# Patient Record
Sex: FEMALE | Race: WHITE
[De-identification: names, ages, dates, MRNs, and addresses within clinical notes are randomized per-mention and may not be internally consistent; named-entity substitution may affect disease eponyms.]

---

## 2021-05-20 NOTE — EDM.PDOC
ED HPI GENERAL MEDICAL PROBLEM





- General


Chief Complaint: OB/GYN Problem


Stated Complaint: VAGINAL BLEEDING/CHEST PAIN


Time Seen by Provider: 05/20/21 18:49


Source of Information: Reports: Patient


History Limitations: Reports: No Limitations





- History of Present Illness


INITIAL COMMENTS - FREE TEXT/NARRATIVE: 





The patient presents with a few complaints.  She said today she had some chest 

pain.  The pain was in the right chest and it radiated to her right upper back. 

She has no pain now.  She also has been having some abdominal pain at times.  

The pain is in the mid to upper abdomen.  She has no fever, chills, cough, 

congestion, runny nose, shortness of breath, nausea, vomiting, diarrhea or 

dysuria.  She says for about 2 1/2 weeks she has been having vaginal bleeding.  

She has no pain with it.  She was on the Depo shot months ago but quit.  She did

see her primary care and she referred her to OB/GYN but they are all full.  She 

has a history of headaches from a skull fracture when she was young.  She is on 

propranolol for that.  She also asked to be checked for STDs.  She said she just

kicked her boyfriend out because he was cheating on her.  I asked if she wanted 

to be tested for HIV and she said now.  She just wanted the gonorrhea and 

chlamydia.  She had chlamydia in the past.  She denies having symptoms such as 

vaginal discharge or discomfort.


Onset: Sudden


Duration: Hour(s):


Location: Reports: Chest


Quality: Reports: Sharp


Severity: Moderate


Improves with: Reports: None


Worsens with: Reports: None


Associated Symptoms: Reports: Chest Pain.  Denies: Cough, Fever/Chills, 

Headaches, Nausea/Vomiting, Shortness of Breath


  ** Middle Chest


Pain Score (Numeric/FACES): 4





- Related Data


                                    Allergies











Allergy/AdvReac Type Severity Reaction Status Date / Time


 


No Known Allergies Allergy   Verified 05/20/21 18:48











Home Meds: 


                                    Home Meds





Propranolol HCl [Propranolol] 40 mg PO BID 05/20/21 [History]











Past Medical History


Neurological History: Reports: Migraines





- Infectious Disease History


Infectious Disease History: Reports: None





Social & Family History





- Family History


Family Medical History: No Pertinent Family History





- Tobacco Use


Tobacco Use Status *Q: Never Tobacco User


Second Hand Smoke Exposure: No





- Caffeine Use


Caffeine Use: Reports: None





- Recreational Drug Use


Recreational Drug Use: No





ED ROS GENERAL





- Review of Systems


Review Of Systems: See Below


Constitutional: Reports: No Symptoms


HEENT: Reports: No Symptoms


Respiratory: Reports: No Symptoms


Cardiovascular: Reports: Chest Pain


Endocrine: Reports: No Symptoms


GI/Abdominal: Reports: Abdominal Pain.  Denies: Diarrhea, Nausea, Vomiting


: Reports: Other (vaginal bleeding).  Denies: Dysuria


Musculoskeletal: Reports: No Symptoms


Skin: Reports: No Symptoms





ED EXAM, GI/ABD





- Physical Exam


Exam: See Below


Exam Limited By: No Limitations


General Appearance: Alert, No Apparent Distress


Ears: Normal External Exam


Nose: Normal Inspection


Head: Atraumatic, Normocephalic


Neck: Normal Inspection


Respiratory/Chest: No Respiratory Distress, Lungs Clear, Normal Breath Sounds


Cardiovascular: Regular Rate, Rhythm, No Edema, No Murmur


GI/Abdominal Exam: Soft, Non-Tender, No Organomegaly, No Mass


Back Exam: Normal Inspection


Extremities: Normal Inspection


Neurological: Alert, Oriented, No Motor/Sensory Deficits





Course





- Vital Signs


Last Recorded V/S: 


                                Last Vital Signs











Temp  97.1 F   05/20/21 18:41


 


Pulse  111 H  05/20/21 18:41


 


Resp  16   05/20/21 18:41


 


BP  113/77   05/20/21 18:41


 


Pulse Ox  98   05/20/21 18:41














- Orders/Labs/Meds


Orders: 


                               Active Orders 24 hr











 Category Date Time Status


 


 Cardiac Monitoring [RC] .AS DIRECTED Care  05/20/21 18:56 Active


 


 EKG Documentation Completion [RC] STAT Care  05/20/21 18:56 Active


 


 CHLAMYDIA AND GONORRHEA BY TMA Stat Lab  05/20/21 18:58 Ordered


 


 GC/CHLAMYDIA BY PCR [MOLEC] Stat Lab  05/20/21 20:47 Ordered











Labs: 


                                Laboratory Tests











  05/20/21 05/20/21 05/20/21 Range/Units





  10:21 10:21 10:21 


 


WBC  5.44    (3.98-10.04)  K/mm3


 


RBC  4.53    (3.98-5.22)  M/mm3


 


Hgb  13.2    (11.2-15.7)  gm/dl


 


Hct  38.6    (34.1-44.9)  %


 


MCV  85.2    (79.4-94.8)  fl


 


MCH  29.1    (25.6-32.2)  pg


 


MCHC  34.2    (32.2-35.5)  g/dl


 


RDW Std Deviation  38.2    (36.4-46.3)  fL


 


Plt Count  256    (182-369)  K/mm3


 


MPV  9.8    (9.4-12.3)  fl


 


Neut % (Auto)  61.7    (34.0-71.1)  %


 


Lymph % (Auto)  27.4    (19.3-51.7)  %


 


Mono % (Auto)  8.5    (4.7-12.5)  %


 


Eos % (Auto)  1.5    (0.7-5.8)  


 


Baso % (Auto)  0.7    (0.1-1.2)  %


 


Neut # (Auto)  3.36    (1.56-6.13)  K/mm3


 


Lymph # (Auto)  1.49    (1.18-3.74)  K/mm3


 


Mono # (Auto)  0.46 H    (0.24-0.36)  K/mm3


 


Eos # (Auto)  0.08    (0.04-0.36)  K/mm3


 


Baso # (Auto)  0.04    (0.01-0.08)  K/mm3


 


Sodium   140   (136-145)  mEq/L


 


Potassium   3.2 L   (3.5-5.1)  mEq/L


 


Chloride   104   ()  mEq/L


 


Carbon Dioxide   27   (21-32)  mEq/L


 


Anion Gap   12.2   (5-15)  


 


BUN   18   (7-18)  mg/dL


 


Creatinine   0.9   (0.55-1.02)  mg/dL


 


Est Cr Clr Drug Dosing   84.75   mL/min


 


Estimated GFR (MDRD)   > 60   (>60)  mL/min


 


BUN/Creatinine Ratio   20.0 H   (14-18)  


 


Glucose   118 H   (70-99)  mg/dL


 


Calcium   8.6   (8.5-10.1)  mg/dL


 


Total Bilirubin   0.4   (0.2-1.0)  mg/dL


 


AST   11 L   (15-37)  U/L


 


ALT   11 L   (14-59)  U/L


 


Alkaline Phosphatase   60   ()  U/L


 


Troponin I   < 0.017   (0.00-0.056)  ng/mL


 


Total Protein   7.4   (6.4-8.2)  g/dl


 


Albumin   4.2   (3.4-5.0)  g/dl


 


Globulin   3.2   gm/dL


 


Albumin/Globulin Ratio   1.3   (1-2)  


 


Lipase   114   ()  U/L


 


HCG, Qual    Negative  (NEGATIVE)  














- Re-Assessments/Exams


Free Text/Narrative Re-Assessment/Exam: 





05/20/21 19:05


I have ordered an EKG, CXR, labs, urine for gonorrhea and chlamydia and pelvic 

exam with wet prep.  My nurse came to tell me the patient did not want to have 

the pelvic exam done.  She would rather see her primary care provider.


05/20/21 20:55


Her CXR looks good.  Her EKG shows a NSR with no acute changes.  Her CBC and CMP

 look good.  Her troponin is negative.  The gonorrhea and chlamydia will take 

awhile.  I will discharge her and call her the results.





Departure





- Departure


Time of Disposition: 21:00


Disposition: Home, Self-Care 01


Condition: Good


Clinical Impression: 


 Atypical chest pain, Vaginal bleeding








- Discharge Information


*PRESCRIPTION DRUG MONITORING PROGRAM REVIEWED*: Not Applicable


*COPY OF PRESCRIPTION DRUG MONITORING REPORT IN PATIENT MARIJA: Not Applicable


Referrals: 


Colette Green NP [Primary Care Provider] - 1 Week


Forms:  ED Department Discharge


Additional Instructions: 


Drink plenty of fluids.  Take tylenol or motrin for pain.  I will call you with 

the results of that urine test.





Sepsis Event Note (ED)





- Evaluation


Sepsis Screening Result: No Definite Risk





- Focused Exam


Vital Signs: 


                                   Vital Signs











  Temp Pulse Resp BP Pulse Ox


 


 05/20/21 18:41  97.1 F  111 H  16  113/77  98














- My Orders


Last 24 Hours: 


My Active Orders





05/20/21 18:56


Cardiac Monitoring [RC] .AS DIRECTED 


EKG Documentation Completion [RC] STAT 





05/20/21 18:58


CHLAMYDIA AND GONORRHEA BY TMA Stat 





05/20/21 20:47


GC/CHLAMYDIA BY PCR [[facility]LEC] Stat 














- Assessment/Plan


Last 24 Hours: 


My Active Orders





05/20/21 18:56


Cardiac Monitoring [RC] .AS DIRECTED 


EKG Documentation Completion [RC] STAT 





05/20/21 18:58


CHLAMYDIA AND GONORRHEA BY TMA Stat 





05/20/21 20:47


GC/CHLAMYDIA BY PCR [MOLEC] Stat

## 2021-05-20 NOTE — CR
Chest: Frontal and lateral views of the chest were obtained.

 

Comparison: No prior chest imaging is available.

 

Slight scoliosis is noted within the spine.  Heart size and 

mediastinum are normal.  Lungs are clear with no acute parenchymal 

change.

 

Impression:

1.  Nothing acute is seen on 2 view chest x-ray.

 

Diagnostic code #2

## 2021-08-04 NOTE — EDM.PDOC
ED HPI GENERAL MEDICAL PROBLEM





- General


Chief Complaint: Abdominal Pain


Stated Complaint: CHEST PAIN / ABDOMINAL PAIN


Time Seen by Provider: 08/04/21 01:12


Source of Information: Reports: Patient


History Limitations: Reports: No Limitations





- History of Present Illness


INITIAL COMMENTS - FREE TEXT/NARRATIVE: 





Ms. Marin is a very pleasant 21-year-old woman who now presents the ED stating 

that she has been experiencing episodic epigastric pain radiating up into her 

chest for the past few months, worse over the past few days.  She reports having

nausea after eating, but no vomiting.





The patient states that she was seen at an ED in [place], and that some form of 

study indicated that she had abnormal gallbladder function.  She was instructed 

to follow-up, but never did, citing lack of health insurance.





Medical records indicate that the patient was seen in this ED for essentially 

the same complaint on 5/20/2021.  She was found to be hemodynamically stable, 

afebrile, saturating 98% on room air.  Her physical exam was unremarkable.  

Work-up included a CBC, CMP, lipase, troponin, serum qualitative hCG, urine 

GC/chlamydia by PCR, chest x-ray, and ECG.  Her entire work-up was unremarkable.





The patient states that she talked with her PCP yesterday, Tuesday, 8/3/2021, 

and was told that nothing could be done.  She was prescribed an increased dose 

of her anti-anxiety medication, whose name she does not recall, plus a different

anti-anxiety medication.  She states that she has not yet started either of 

them.





Here in the ED tonight, the patient was initially found to be slightly 

tachycardic at 105 bpm, otherwise, she is hemodynamically stable, afebrile, 

saturating 100% on room air.  She appears to be comfortable, in no acute 

distress.  She states that she last ate around 22:00 last night.





Other than her recurrent epigastric pain radiating to her chest, and nausea, the

patient denies having a recent fever, chills, sore throat, ear pain, nasal or 

sinus congestion, cough, dyspnea, palpitations, vomiting, constipation, 

diarrhea, urinary symptoms, recent weight gain or weight loss, recent bloody 

bowel movements or black bowel movements, recent joint aches, headaches, or 

rashes.








The patient's PCP is Colette Green NP.








  ** Abdomen


Pain Score (Numeric/FACES): 10





- Related Data


                                    Allergies











Allergy/AdvReac Type Severity Reaction Status Date / Time


 


No Known Allergies Allergy   Verified 08/03/21 23:42











Home Meds: 


                                    Home Meds





Propranolol HCl [Propranolol] 40 mg PO BID 05/20/21 [History]


Escitalopram [Lexapro] 10 mg PO DAILY 08/03/21 [History]


hydrOXYzine HCL [Atarax] 5 ml PO QID PRN 08/03/21 [History]











Past Medical History


Musculoskeletal History: Reports: Fracture (Skull fx when 3 yrs old. Right ankle

 fx.)


Neurological History: Reports: Migraines


Psychiatric History: Reports: Anxiety





Social & Family History





- Tobacco Use


Tobacco Use Status *Q: Never Tobacco User





- Caffeine Use


Caffeine Use: Reports: None





- Alcohol Use


Alcohol Use History: No





- Recreational Drug Use


Recreational Drug Use: No





- Living Situation & Occupation


Living situation: Reports: Single, with Family


Occupation: Employed ([employer])





ED ROS GENERAL





- Review of Systems


Review Of Systems: Comprehensive ROS is negative, except as noted in HPI.





ED EXAM, GI/ABD





- Physical Exam


Exam: See Below


Exam Limited By: No Limitations


General Appearance: Alert, No Apparent Distress, Thin


Eyes: Bilateral: Normal Appearance, EOMI


Ears: Normal External Exam, Hearing Grossly Normal


Nose: Normal Inspection


Throat/Mouth: Normal Inspection, Normal Lips, Normal Voice, No Airway Compromise


Head: Atraumatic, Normocephalic


Neck: Normal Inspection, Full Range of Motion


Respiratory/Chest: No Respiratory Distress, Lungs Clear, Normal Breath Sounds, 

No Accessory Muscle Use


Cardiovascular: Normal Peripheral Pulses, Regular Rate, Rhythm, No Edema, No 

Gallop, No JVD, No Murmur, No Rub


GI/Abdominal Exam: Normal Bowel Sounds, Soft, No Organomegaly, No Distention, No

 Abnormal Bruit, No Mass, Tender (Mild, in the epigastrium only.  Nontender 

elsewhere.)


Back Exam: Normal Inspection, Full Range of Motion.  No: CVA Tenderness (L), CVA

 Tenderness (R)


Extremities: Normal Inspection, Normal Range of Motion, No Pedal Edema, Normal 

Capillary Refill


Neurological: Alert, Oriented, Normal Cognition, No Motor/Sensory Deficits


Psychiatric: Normal Affect


Skin Exam: Warm, Dry, Intact, Normal Color, No Rash





Course





- Vital Signs


Last Recorded V/S: 


                                Last Vital Signs











Temp  37.0 C   08/03/21 23:44


 


Pulse  105 H  08/03/21 23:44


 


Resp  15   08/03/21 23:44


 


BP  120/77   08/03/21 23:44


 


Pulse Ox  100   08/03/21 23:44














- Orders/Labs/Meds


Labs: 


                                Laboratory Tests











  08/04/21 08/04/21 Range/Units





  02:09 02:09 


 


WBC  6.89   (3.98-10.04)  K/mm3


 


RBC  4.39   (3.98-5.22)  M/mm3


 


Hgb  12.6   (11.2-15.7)  gm/dl


 


Hct  37.7   (34.1-44.9)  %


 


MCV  85.9   (79.4-94.8)  fl


 


MCH  28.7   (25.6-32.2)  pg


 


MCHC  33.4   (32.2-35.5)  g/dl


 


RDW Std Deviation  40.6   (36.4-46.3)  fL


 


Plt Count  267   (182-369)  K/mm3


 


MPV  9.3 L   (9.4-12.3)  fl


 


Neutrophils % (Manual)  60   (40-60)  %


 


Band Neutrophils %  0   (0-10)  %


 


Lymphocytes % (Manual)  30   (20-40)  %


 


Atypical Lymphs %  0   %


 


Monocytes % (Manual)  9   (2-10)  %


 


Eosinophils % (Manual)  1   (0.7-5.8)  %


 


Basophils % (Manual)  0 L   (0.1-1.2)  


 


Platelet Estimate  Adequate   


 


RBC Morph Comment  Normal   


 


Sodium   143  (136-145)  mEq/L


 


Potassium   3.6  (3.5-5.1)  mEq/L


 


Chloride   107  ()  mEq/L


 


Carbon Dioxide   29  (21-32)  mEq/L


 


Anion Gap   10.6  (5-15)  


 


BUN   19 H  (7-18)  mg/dL


 


Creatinine   0.7  (0.55-1.02)  mg/dL


 


Est Cr Clr Drug Dosing   118.52  mL/min


 


Estimated GFR (MDRD)   > 60  (>60)  mL/min


 


BUN/Creatinine Ratio   27.1 H  (14-18)  


 


Glucose   95  (70-99)  mg/dL


 


Calcium   8.8  (8.5-10.1)  mg/dL


 


Total Bilirubin   0.4  (0.2-1.0)  mg/dL


 


AST   11 L  (15-37)  U/L


 


ALT   15  (14-59)  U/L


 


Alkaline Phosphatase   69  ()  U/L


 


Total Protein   7.0  (6.4-8.2)  g/dl


 


Albumin   3.7  (3.4-5.0)  g/dl


 


Globulin   3.3  gm/dL


 


Albumin/Globulin Ratio   1.1  (1-2)  


 


Lipase   83  ()  U/L











Meds: 


Medications














Discontinued Medications














Generic Name Dose Route Start Last Admin





  Trade Name Freq  PRN Reason Stop Dose Admin


 


Al Hydroxide/Mg Hydroxide 30  0 ml  08/04/21 01:38  08/04/21 02:07





  ml/ Lidocaine HCl 15 ml  PO  08/04/21 01:39  45 ml





  ONETIME STA   Administration


 


Famotidine  40 mg  08/04/21 03:06  08/04/21 03:18





  Famotidine 20 Mg Tab  PO  08/04/21 03:07  40 mg





  ONETIME STA   Administration


 


Sodium Chloride  1,000 mls @ 150 mls/hr  08/04/21 01:45  08/04/21 02:06





  Normal Saline  IV   150 mls/hr





  ASDIRECTED G[facility]LAND   Administration


 


Ondansetron HCl  4 mg  08/04/21 01:38  08/04/21 02:06





  Ondansetron 4 Mg/2 Ml Sdv  IVPUSH  08/04/21 01:39  4 mg





  ONETIME ONE   Administration














- Re-Assessments/Exams


Free Text/Narrative Re-Assessment/Exam: 





08/04/21 01:39


As above, the patient has been experiencing intermittent epigastric pain 

radiating up into her chest for the past few months.  She states that she was 

seen in an ED in [place], where she might have undergone a HIDA scan, which showed

 abnormal biliary function.  She was seen in this ED on 5/20/2021, where a work-

up included blood work, a urine GC/chlamydia by PCR, a chest x-ray, and an ECG, 

all of which were unremarkable.  She states that her symptoms have gotten worse 

over the past few days, and that she has nausea after eating, but no vomiting.  

On examination, she reports mild epigastric tenderness, otherwise, her exam is 

unremarkable.  She will be given a GI cocktail, to see if that has any effect on

 her symptoms.  I have ordered a work-up that includes several blood tests, 

however, the patient declined an offer for a CT of the abdomen and pelvis, 

stating that she has to be at work at 6 AM.  Because she ate at 22:00, an 

ultrasound of her right upper quadrant is not an option at this time.  She will 

be given some IV fluid and IV Zofran, however, again, because she has to be at 

work at 6 AM, she declined an offer for pain medication.








08/04/21 02:27


The patient reports that the GI cocktail did help with her symptoms.








08/04/21 03:06


The patient's CBC is unremarkable.


Her CMP is unremarkable.


Her lipase level is within normal limits at 83.





Based on the above, I have ordered 40 mg of oral famotidine.








08/04/21 03:09


Test results discussed with the patient. As above, since she had improvement of 

her symptoms following GI cocktail, it appears that her symptoms are due to 

GERD. Going forward, I will recommend that she take 1 tablet of OTC famotidine 

twice a day x 7 days, after which she can take 1 tablet each morning. If her 

symptoms return, she should return to a BID schedule. If she wants to look 

further into whether or not she has gallbladder disease, have recommended that 

she follow-up with her PCP, who can order an outpatient evaluation.











Departure





- Departure


Time of Disposition: 03:10


Disposition: Home, Self-Care 01


Condition: Good


Clinical Impression: 


 GERD (gastroesophageal reflux disease)








- Discharge Information


*PRESCRIPTION DRUG MONITORING PROGRAM REVIEWED*: Not Applicable


*COPY OF PRESCRIPTION DRUG MONITORING REPORT IN PATIENT MARIJA: Not Applicable


Instructions:  Gastroesophageal Reflux Disease, Adult


Referrals: 


Colette Green NP [Primary Care Provider] - 


Forms:  ED Department Discharge


Additional Instructions: 


You were seen in the emergency room for 2 months of intermittent upper midline 

abdominal pain radiating into your chest, associated with nausea.





Work-up in the ER included several blood tests, which returned unremarkable. 

There is no sign of an infection. You do not have pancreatitis.





You were given a GI cocktail, which improved your symptoms, indicating that your

symptoms are most likely due to GERD, also known as acid reflux.





You have been started on antacid medicine famotidine (Pepcid). Famotidine is 

available over-the-counter, and is just as good as brand-name Pepcid. We 

recommend that you take 1 tablet of famotidine twice a day for 1 week, then 

decrease the dosage to 1 tablet each morning.





If your symptoms return after you decrease your dosage, you should return to 1 

tablet twice a day, however, if you remain asymptomatic, then we recommend that 

you continue on 1 tablet of famotidine every morning.





If you want to pursue further evaluation of your gallbladder, we recommend that 

you follow-up with your PCP, Colette Green NP, for an outpatient work-up that 

could include an ultrasound of your gallbladder and a HIDA scan.





If any other problems, please do not hesitate to return to the ER.





Sepsis Event Note (ED)





- Evaluation


Sepsis Screening Result: No Definite Risk





- Focused Exam


Vital Signs: 


                                   Vital Signs











  Temp Pulse Resp BP Pulse Ox


 


 08/03/21 23:44  37.0 C  105 H  15  120/77  100

## 2021-09-22 NOTE — EDM.PDOC
ED HPI GENERAL MEDICAL PROBLEM





- General


Chief Complaint: OB/GYN Problem


Stated Complaint: VAGINAL BLEEDING


Time Seen by Provider: 09/22/21 13:27


Source of Information: Reports: Patient, RN Notes Reviewed


History Limitations: Reports: No Limitations





- History of Present Illness


INITIAL COMMENTS - FREE TEXT/NARRATIVE: 





Patient is a 21-year-old female who presents to the ER for her heavy vaginal 

bleeding.  Patient states she has never been pregnant before ever.  She has been

having unprotected sex, so there is a possibility that she could be pregnant.  

She is having some lower abdominal discomfort with this.  States that she had 

her period twice this month already, and that she is soaking through about 6 or 

8 pads pretty regularly on a daily basis.  States that when she was in the 

bathroom, and wipes to get off of the toilet, that the blood still is dripping 

out.  She does have a history of ovarian cysts as well.  She states that the 

pain does feel like period cramps however it is a little bit worse than that.  

She has not had any fevers or chills, cough or shortness of breath or any sort 

of nausea/vomiting/diarrhea.


  ** Abdomen


Pain Score (Numeric/FACES): 8





- Related Data


                                    Allergies











Allergy/AdvReac Type Severity Reaction Status Date / Time


 


No Known Allergies Allergy   Verified 09/22/21 13:36











Home Meds: 


                                    Home Meds





Propranolol HCl [Propranolol] 40 mg PO BID 05/20/21 [History]


Escitalopram [Lexapro] 10 mg PO DAILY 08/03/21 [History]


hydrOXYzine HCL [Atarax] 5 ml PO QID PRN 08/03/21 [History]


medroxyPROGESTERone [Provera] 10 mg PO DAILY #10 tab 09/22/21 [Rx]











Past Medical History


Genitourinary History: Reports: STD


Other Genitourinary History: chlamydia


Musculoskeletal History: Reports: Fracture


Other Musculoskeletal History: skull fx when she was 3


Neurological History: Reports: Migraines


Psychiatric History: Reports: Anxiety





Social & Family History





- Family History


Family Medical History: No Pertinent Family History





- Tobacco Use


Tobacco Use Status *Q: Never Tobacco User


Second Hand Smoke Exposure: No





- Caffeine Use


Caffeine Use: Reports: None





- Recreational Drug Use


Recreational Drug Use: No





- Living Situation & Occupation


Living situation: Reports: Single, with Family


Occupation: Employed ([employer])





ED ROS GENERAL





- Review of Systems


Review Of Systems: Comprehensive ROS is negative, except as noted in HPI.





ED EXAM, RENAL/





- Physical Exam


Exam: See Below


Exam Limited By: No Limitations


General Appearance: Alert, WD/WN, No Apparent Distress


Respiratory/Chest: No Respiratory Distress, Lungs Clear, Normal Breath Sounds, 

No Accessory Muscle Use, Chest Non-Tender


Cardiovascular: Normal Peripheral Pulses, Regular Rate, Rhythm, No Edema


GI/Abdominal: Normal Bowel Sounds, Soft, No Distention, No Mass, Tender (lower 

abdomen mainly)


 (Female) Exam: Vaginal Bleeding (as reported by patient- bleeding through 6-8

 pads daily)


Extremities: Normal Inspection, Normal Capillary Refill


Neurological: Alert, Oriented, Normal Cognition, No Motor/Sensory Deficits


Psychiatric: Normal Affect, Normal Mood


Skin Exam: Warm, Dry, Intact, Normal Color, No Rash





Course





- Vital Signs


Last Recorded V/S: 


                                Last Vital Signs











Temp  97.9 F   09/22/21 13:34


 


Pulse  98   09/22/21 13:34


 


Resp  16   09/22/21 13:34


 


BP  127/86   09/22/21 13:34


 


Pulse Ox  100   09/22/21 13:34














- Orders/Labs/Meds


Orders: 


                               Active Orders 24 hr











 Category Date Time Status


 


 Peripheral IV Care [RC] .AS DIRECTED Care  09/22/21 13:36 Ordered


 


 Transvaginal Non OB [US] Stat Exams  09/22/21 15:06 Ordered


 


 PATIENT RETYPE [BBK] Routine Lab  09/22/21 14:40 Ordered


 


 Sodium Chloride 0.9% [Saline Flush] Med  09/22/21 13:35 Ordered





 10 ml FLUSH ASDIRECTED PRN   


 


 Peripheral IV Insertion Adult [OM.PC] Stat Oth  09/22/21 13:36 Ordered








                                Medication Orders





Sodium Chloride (Sodium Chloride 0.9% 10 Ml Syringe)  10 ml FLUSH ASDIRECTED PRN


   PRN Reason: Keep Vein Open








Labs: 


                                Laboratory Tests











  09/22/21 09/22/21 09/22/21 Range/Units





  13:40 13:45 13:45 


 


WBC   6.83   (3.98-10.04)  K/mm3


 


RBC   4.94   (3.98-5.22)  M/mm3


 


Hgb   14.1  D   (11.2-15.7)  gm/dl


 


Hct   42.3   (34.1-44.9)  %


 


MCV   85.6   (79.4-94.8)  fl


 


MCH   28.5   (25.6-32.2)  pg


 


MCHC   33.3   (32.2-35.5)  g/dl


 


RDW Std Deviation   41.1   (36.4-46.3)  fL


 


Plt Count   257   (182-369)  K/mm3


 


MPV   9.2 L   (9.4-12.3)  fl


 


Neut % (Auto)   72.4 H   (34.0-71.1)  %


 


Lymph % (Auto)   18.2 L   (19.3-51.7)  %


 


Mono % (Auto)   7.0   (4.7-12.5)  %


 


Eos % (Auto)   2.0   (0.7-5.8)  


 


Baso % (Auto)   0.3   (0.1-1.2)  %


 


Neut # (Auto)   4.94   (1.56-6.13)  K/mm3


 


Lymph # (Auto)   1.24   (1.18-3.74)  K/mm3


 


Mono # (Auto)   0.48 H   (0.24-0.36)  K/mm3


 


Eos # (Auto)   0.14   (0.04-0.36)  K/mm3


 


Baso # (Auto)   0.02   (0.01-0.08)  K/mm3


 


Sodium    140  (136-145)  mEq/L


 


Potassium    3.7  (3.5-5.1)  mEq/L


 


Chloride    103  ()  mEq/L


 


Carbon Dioxide    28  (21-32)  mEq/L


 


Anion Gap    12.7  (5-15)  


 


BUN    22 H  (7-18)  mg/dL


 


Creatinine    0.7  (0.55-1.02)  mg/dL


 


Est Cr Clr Drug Dosing    119.01  mL/min


 


Estimated GFR (MDRD)    > 60  (>60)  mL/min


 


BUN/Creatinine Ratio    31.4 H  (14-18)  


 


Glucose    97  (70-99)  mg/dL


 


Calcium    8.9  (8.5-10.1)  mg/dL


 


Total Bilirubin    0.3  (0.2-1.0)  mg/dL


 


AST    17  (15-37)  U/L


 


ALT    26  (14-59)  U/L


 


Alkaline Phosphatase    86  ()  U/L


 


C-Reactive Protein    <0.2  (<1.0)  mg/dL


 


Total Protein    8.0  (6.4-8.2)  g/dl


 


Albumin    4.1  (3.4-5.0)  g/dl


 


Globulin    3.9  gm/dL


 


Albumin/Globulin Ratio    1.1  (1-2)  


 


HCG, Qual     (NEGATIVE)  


 


Urine Color  Yellow    (Yellow)  


 


Urine Appearance  Clear    (Clear)  


 


Urine pH  6.0    (5.0-8.0)  


 


Ur Specific Gravity  > or = 1.030    (1.005-1.030)  


 


Urine Protein  Negative    (Negative)  


 


Urine Glucose (UA)  Negative    (Negative)  


 


Urine Ketones  Negative    (Negative)  


 


Urine Occult Blood  3+ H    (Negative)  


 


Urine Nitrite  Negative    (Negative)  


 


Urine Bilirubin  Negative    (Negative)  


 


Urine Urobilinogen  0.2    (0.2-1.0)  


 


Ur Leukocyte Esterase  Negative    (Negative)  


 


U Hyaline Cast (Auto)  Np    


 


Urine RBC  10-20 H    (0-5)  /hpf


 


Urine WBC  0-5    (0-5)  /hpf


 


Ur Squamous Epith Cells  5-10 H    (0-5)  /hpf


 


Urine Bacteria  Few    (FEW)  /hpf


 


Urine Mucus  Few    (FEW)  /hpf


 


Blood Type     


 


Gel Antibody Screen     














  09/22/21 09/22/21 Range/Units





  13:45 13:45 


 


WBC    (3.98-10.04)  K/mm3


 


RBC    (3.98-5.22)  M/mm3


 


Hgb    (11.2-15.7)  gm/dl


 


Hct    (34.1-44.9)  %


 


MCV    (79.4-94.8)  fl


 


MCH    (25.6-32.2)  pg


 


MCHC    (32.2-35.5)  g/dl


 


RDW Std Deviation    (36.4-46.3)  fL


 


Plt Count    (182-369)  K/mm3


 


MPV    (9.4-12.3)  fl


 


Neut % (Auto)    (34.0-71.1)  %


 


Lymph % (Auto)    (19.3-51.7)  %


 


Mono % (Auto)    (4.7-12.5)  %


 


Eos % (Auto)    (0.7-5.8)  


 


Baso % (Auto)    (0.1-1.2)  %


 


Neut # (Auto)    (1.56-6.13)  K/mm3


 


Lymph # (Auto)    (1.18-3.74)  K/mm3


 


Mono # (Auto)    (0.24-0.36)  K/mm3


 


Eos # (Auto)    (0.04-0.36)  K/mm3


 


Baso # (Auto)    (0.01-0.08)  K/mm3


 


Sodium    (136-145)  mEq/L


 


Potassium    (3.5-5.1)  mEq/L


 


Chloride    ()  mEq/L


 


Carbon Dioxide    (21-32)  mEq/L


 


Anion Gap    (5-15)  


 


BUN    (7-18)  mg/dL


 


Creatinine    (0.55-1.02)  mg/dL


 


Est Cr Clr Drug Dosing    mL/min


 


Estimated GFR (MDRD)    (>60)  mL/min


 


BUN/Creatinine Ratio    (14-18)  


 


Glucose    (70-99)  mg/dL


 


Calcium    (8.5-10.1)  mg/dL


 


Total Bilirubin    (0.2-1.0)  mg/dL


 


AST    (15-37)  U/L


 


ALT    (14-59)  U/L


 


Alkaline Phosphatase    ()  U/L


 


C-Reactive Protein    (<1.0)  mg/dL


 


Total Protein    (6.4-8.2)  g/dl


 


Albumin    (3.4-5.0)  g/dl


 


Globulin    gm/dL


 


Albumin/Globulin Ratio    (1-2)  


 


HCG, Qual  Negative   (NEGATIVE)  


 


Urine Color    (Yellow)  


 


Urine Appearance    (Clear)  


 


Urine pH    (5.0-8.0)  


 


Ur Specific Gravity    (1.005-1.030)  


 


Urine Protein    (Negative)  


 


Urine Glucose (UA)    (Negative)  


 


Urine Ketones    (Negative)  


 


Urine Occult Blood    (Negative)  


 


Urine Nitrite    (Negative)  


 


Urine Bilirubin    (Negative)  


 


Urine Urobilinogen    (0.2-1.0)  


 


Ur Leukocyte Esterase    (Negative)  


 


U Hyaline Cast (Auto)    


 


Urine RBC    (0-5)  /hpf


 


Urine WBC    (0-5)  /hpf


 


Ur Squamous Epith Cells    (0-5)  /hpf


 


Urine Bacteria    (FEW)  /hpf


 


Urine Mucus    (FEW)  /hpf


 


Blood Type   O POSITIVE  


 


Gel Antibody Screen   Negative  











Meds: 


Medications











Generic Name Dose Route Start Last Admin





  Trade Name Freq  PRN Reason Stop Dose Admin


 


Sodium Chloride  10 ml  09/22/21 13:35 





  Sodium Chloride 0.9% 10 Ml Syringe  FLUSH  





  ASDIRECTED PRN  





  Keep Vein Open  














Discontinued Medications














Generic Name Dose Route Start Last Admin





  Trade Name Freq  PRN Reason Stop Dose Admin


 


Ketorolac Tromethamine  30 mg  09/22/21 15:03  09/22/21 15:09





  Ketorolac 30 Mg/Ml Sdv  IM  09/22/21 15:04  30 mg





  ONETIME ONE   Administration














- Re-Assessments/Exams


Free Text/Narrative Re-Assessment/Exam: 





09/22/21 13:51


Patient presents to the ER for evaluation of vaginal bleeding.  We will go ahead

 and evaluate for pregnancy initially along with basic labs to evaluate the 

extent of the blood loss.





09/22/21 14:35


Labs are unremarkable at this time, patient's hemoglobin is at a level of 14.  

hCG qualitative serum level is still pending.





09/22/21 15:09


hCG test is negative for today's purposes.  Patient was requesting something for

 pain I have ordered some IM Toradol for this.  I did go and discuss  lab 

results with the patient, and since she has a history of ovarian cysts, she was 

wondering if she could get an ultrasound done while she is here to rule out the 

possibility of any sort of ruptured ovarian cysts or complications pertaining to

 her cysts.  Although I do believe this to be dysfunctional uterine bleeding, I 

have ordered a transvaginal non-OB ultrasound for further evaluation.





09/22/21 16:47


Patient's ultrasound demonstrates no sign of ovarian cyst at this time, there is

 septated irregular fluid collections within the endocervical canal and the 

endometrium of the lower uterine segment fluid component could be impending 

menses which correlates clinically, or endometritis.





Departure





- Departure


Time of Disposition: 16:47


Disposition: Home, Self-Care 01


Condition: Good


Clinical Impression: 


 Dysfunctional uterine bleeding








- Discharge Information


*PRESCRIPTION DRUG MONITORING PROGRAM REVIEWED*: No


*COPY OF PRESCRIPTION DRUG MONITORING REPORT IN PATIENT MARIJA: No


Prescriptions: 


medroxyPROGESTERone [Provera] 10 mg PO DAILY #10 tab


Instructions:  Abnormal Uterine Bleeding, Easy-to-Read


Referrals: 


Colette Green NP [Primary Care Provider] - 


Forms:  ED Department Discharge


Additional Instructions: 


You were seen in the ER for your vaginal bleeding.





Laboratory evaluation at today's visit demonstrated no sign of a bacterial 

infection, pregnancy, or any sort of electrolyte abnormalities.  It is highly 

likely this is due to dysfunctional uterine bleeding, which sometimes happens.





Your ultrasound demonstrated no sign of any sort of ovarian cyst at today's 

visit.





You have been placed on a medication to "reset" your menstrual cycle.  You will 

need to take 1 tablet daily for the next 10 days.  This medication was 

electronically sent to the [facility] pharmacy located on [address].





If you do not have a primary care provider already, I recommend that you follow-

up with a provider in our clinic, any family practice provider or OB/GYN would 

be able to provide you with the services.  Our clinic telephone number 

[phone], please call in the morning to obtain an appointment with the 

provider, for follow-up of your symptoms that prompted your ER visit today.





Do not hesitate to return to the ER at any time if symptoms change or worsen.





Sepsis Event Note (ED)





- Evaluation


Sepsis Screening Result: No Definite Risk





- Focused Exam


Vital Signs: 


                                   Vital Signs











  Temp Pulse Resp BP Pulse Ox


 


 09/22/21 13:34  97.9 F  98  16  127/86  100














- My Orders


Last 24 Hours: 


My Active Orders





09/22/21 13:35


Sodium Chloride 0.9% [Saline Flush]   10 ml FLUSH ASDIRECTED PRN 





09/22/21 13:36


Peripheral IV Care [RC] .AS DIRECTED 


Peripheral IV Insertion Adult [OM.PC] Stat 





09/22/21 14:40


PATIENT RETYPE [BBK] Routine 





09/22/21 15:06


Transvaginal Non OB [US] Stat 














- Assessment/Plan


Last 24 Hours: 


My Active Orders





09/22/21 13:35


Sodium Chloride 0.9% [Saline Flush]   10 ml FLUSH ASDIRECTED PRN 





09/22/21 13:36


Peripheral IV Care [RC] .AS DIRECTED 


Peripheral IV Insertion Adult [OM.PC] Stat 





09/22/21 14:40


PATIENT RETYPE [BBK] Routine 





09/22/21 15:06


Transvaginal Non OB [US] Stat

## 2021-10-07 NOTE — EDM.PDOC
ED HPI GENERAL MEDICAL PROBLEM





- General


Chief Complaint: General


Stated Complaint: FEVER/COUGH/DIARRHEA/VOMITING


Time Seen by Provider: 10/07/21 20:23


Source of Information: Reports: Patient, RN Notes Reviewed


History Limitations: Reports: No Limitations





- History of Present Illness


INITIAL COMMENTS - FREE TEXT/NARRATIVE: 





Patient is a 21-year old female who presents to the ER for her multiple upper 

respiratory symptoms.  Patient states that this started on Monday, but has 

worsened today.  She has a cough, feels feverish, has had some chills, body 

aches, nausea without vomiting, and has had diarrhea as well.  States she has 

tried multiple over-the-counter medications and nothing really seems to be 

providing much relief.  Patient states that she has not been around anyone has 

been sick that she is aware of.  She does state that her brother and sister both

leave the house for work, but she is not left the house all week.





- Related Data


                                    Allergies











Allergy/AdvReac Type Severity Reaction Status Date / Time


 


No Known Allergies Allergy   Verified 10/07/21 20:21











Home Meds: 


                                    Home Meds





Propranolol HCl [Propranolol] 40 mg PO BID 05/20/21 [History]


Escitalopram [Lexapro] 10 mg PO DAILY 08/03/21 [History]


hydrOXYzine HCL [Atarax] 5 ml PO QID PRN 08/03/21 [History]


medroxyPROGESTERone [Provera] 10 mg PO DAILY #10 tab 09/22/21 [Rx]


Codeine/Promethazine [Phenergan with Codeine] 5 ml PO Q4HR PRN #120 ml 10/07/21 

[Rx]











Past Medical History


Genitourinary History: Reports: STD


Other Genitourinary History: chlamydia


OB/GYN History: Reports: Dysfunctional Uterine Bleeding, Other (See Below)


Other OB/GYN History: ovarian cysts


Musculoskeletal History: Reports: Fracture


Other Musculoskeletal History: skull fx when she was 3


Neurological History: Reports: Migraines


Psychiatric History: Reports: Anxiety





Social & Family History





- Family History


Family Medical History: No Pertinent Family History





- Caffeine Use


Caffeine Use: Reports: None





- Living Situation & Occupation


Living situation: Reports: Single, with Family


Occupation: Employed ()





ED ROS GENERAL





- Review of Systems


Review Of Systems: Comprehensive ROS is negative, except as noted in HPI.





ED EXAM, GENERAL





- Physical Exam


Exam: See Below


Exam Limited By: No Limitations


General Appearance: Alert, WD/WN, No Apparent Distress


Respiratory/Chest: No Respiratory Distress, Lungs Clear, Normal Breath Sounds, 

No Accessory Muscle Use, Chest Non-Tender


Cardiovascular: Normal Peripheral Pulses, Regular Rate, Rhythm, No Edema


GI/Abdominal: Normal Bowel Sounds, Soft, Non-Tender, No Distention, No Mass


Neurological: Alert, Oriented, Normal Cognition, No Motor/Sensory Deficits


Psychiatric: Normal Affect, Normal Mood


Skin Exam: Warm, Dry, Intact, Normal Color, No Rash





Course





- Vital Signs


Last Recorded V/S: 


                                Last Vital Signs











Temp  98.4 F   10/07/21 20:18


 


Pulse  128 H  10/07/21 20:18


 


Resp  18   10/07/21 20:18


 


BP  116/74   10/07/21 20:18


 


Pulse Ox  99   10/07/21 20:18














- Orders/Labs/Meds


Labs: 


                                Laboratory Tests











  10/07/21 Range/Units





  20:10 


 


Influenza Type A RNA  Negative  (NEGATIVE)  


 


Influenza Type B RNA  Negative  (NEGATIVE)  


 


SARS-CoV-2 RNA (PARIS)  Negative  (NEGATIVE)  











Meds: 


Medications














Discontinued Medications














Generic Name Dose Route Start Last Admin





  Trade Name Freq  PRN Reason Stop Dose Admin


 


Ketorolac Tromethamine  30 mg  10/07/21 20:30  10/07/21 20:41





  Ketorolac 30 Mg/Ml Sdv  IM  10/07/21 20:31  30 mg





  ONETIME ONE   Administration


 


Promethazine HCl/Codeine  10 ml  10/07/21 20:30  10/07/21 20:40





  Codeine/Promethazine 10-6.25 Mg/5 Ml Syrup 5 Ml Ud Cup  PO  10/07/21 20:31  10

 ml





  ONETIME ONE   Administration














- Re-Assessments/Exams


Free Text/Narrative Re-Assessment/Exam: 





10/07/21 20:37


Patient presents to the ER for the evaluation of her multiple upper respiratory 

symptoms.  Covid and flu swab were obtained at time of triage.  We will go ahead

 and give her IM Toradol and some cough medicine to see if this helps relieve 

some of her symptoms.





10/07/21 21:26


The patient's Covid and flu screen are negative at today's visit, however her 

symptoms are very suspicious for COVID-19.  We will have her go ahead and 

isolate herself away from others as much as possible, and have her retest for 

COVID-19 in a few days.





Departure





- Departure


Time of Disposition: 21:26


Disposition: Home, Self-Care 01


Condition: Good


Clinical Impression: 


 Suspected COVID-19 virus infection








- Discharge Information


*PRESCRIPTION DRUG MONITORING PROGRAM REVIEWED*: No


*COPY OF PRESCRIPTION DRUG MONITORING REPORT IN PATIENT MARIJA: No


Prescriptions: 


Codeine/Promethazine [Phenergan with Codeine] 5 ml PO Q4HR PRN #120 ml


 PRN Reason: Cough


Instructions:  COVID-19: Quarantine vs. Isolation - [facility] (12/17/2020)


Referrals: 


Colette Green, NP [Primary Care Provider] - 


Forms:  ED Department Discharge


Additional Instructions: 


You were evaluated in the ER today for your COVID-19-like symptoms.





Your COVID-19 and influenza test did come back negative, but sometimes these are

false negatives.  These Covid screens are only about 60% accurate; and it is 

common to test negative initially but then test positive a few days later. Due 

to this, we recommend that you try to isolate yourself away from others, and get

retested for COVID-19 in about 3 or 4 days. There are multiple sites that can do

this, there is a drive-through clinic conducted by the [facility], there is a walk-in clinic and our Covid clinic that can help you with 

these.





Continue all other medications as previously prescribed.  You were given a 

prescription for cough medication that was sent to the [facility] pharmacy 

located on [address].  Please fill this tomorrow and take as prescribed.





Continue to use Tylenol ibuprofen every 6 hours as needed for ongoing pain 

management.





Do not hesitate to return to the ER at any time if symptoms change or worsen.





Sepsis Event Note (ED)





- Focused Exam


Vital Signs: 


                                   Vital Signs











  Temp Pulse Resp BP Pulse Ox


 


 10/07/21 20:18  98.4 F  128 H  18  116/74  99

## 2023-02-25 ENCOUNTER — HOSPITAL ENCOUNTER (EMERGENCY)
Dept: HOSPITAL 41 - JD.ED | Age: 23
Discharge: HOME | End: 2023-02-25
Payer: MEDICAID

## 2023-02-25 DIAGNOSIS — J45.909: ICD-10-CM

## 2023-02-25 DIAGNOSIS — G43.909: Primary | ICD-10-CM

## 2023-02-25 DIAGNOSIS — R55: ICD-10-CM

## 2023-02-25 LAB — EGFRCR SERPLBLD CKD-EPI 2021: 125 ML/MIN (ref 60–?)

## 2023-02-25 PROCEDURE — 36415 COLL VENOUS BLD VENIPUNCTURE: CPT

## 2023-02-25 PROCEDURE — 96374 THER/PROPH/DIAG INJ IV PUSH: CPT

## 2023-02-25 PROCEDURE — 85025 COMPLETE CBC W/AUTO DIFF WBC: CPT

## 2023-02-25 PROCEDURE — 81025 URINE PREGNANCY TEST: CPT

## 2023-02-25 PROCEDURE — 93005 ELECTROCARDIOGRAM TRACING: CPT

## 2023-02-25 PROCEDURE — 96375 TX/PRO/DX INJ NEW DRUG ADDON: CPT

## 2023-02-25 PROCEDURE — 99284 EMERGENCY DEPT VISIT MOD MDM: CPT

## 2023-02-25 PROCEDURE — 70450 CT HEAD/BRAIN W/O DYE: CPT

## 2023-02-25 PROCEDURE — 96361 HYDRATE IV INFUSION ADD-ON: CPT

## 2023-02-25 PROCEDURE — 80053 COMPREHEN METABOLIC PANEL: CPT

## 2023-02-25 PROCEDURE — 83735 ASSAY OF MAGNESIUM: CPT

## 2023-02-25 PROCEDURE — 93226 XTRNL ECG REC<48 HR SCAN A/R: CPT

## 2023-02-25 PROCEDURE — 93225 XTRNL ECG REC<48 HRS REC: CPT

## 2023-04-23 ENCOUNTER — HOSPITAL ENCOUNTER (EMERGENCY)
Dept: HOSPITAL 41 - JD.ED | Age: 23
LOS: 1 days | Discharge: HOME | End: 2023-04-24
Payer: MEDICAID

## 2023-04-23 DIAGNOSIS — J45.901: Primary | ICD-10-CM

## 2023-04-23 DIAGNOSIS — Z20.822: ICD-10-CM

## 2023-04-23 PROCEDURE — 36415 COLL VENOUS BLD VENIPUNCTURE: CPT

## 2023-04-23 PROCEDURE — 94640 AIRWAY INHALATION TREATMENT: CPT

## 2023-04-23 PROCEDURE — 0240U: CPT

## 2023-04-23 PROCEDURE — 96360 HYDRATION IV INFUSION INIT: CPT

## 2023-04-23 PROCEDURE — 99284 EMERGENCY DEPT VISIT MOD MDM: CPT

## 2023-04-23 PROCEDURE — 83735 ASSAY OF MAGNESIUM: CPT

## 2023-04-23 PROCEDURE — 80053 COMPREHEN METABOLIC PANEL: CPT

## 2023-04-23 PROCEDURE — 85025 COMPLETE CBC W/AUTO DIFF WBC: CPT

## 2023-04-23 PROCEDURE — 81001 URINALYSIS AUTO W/SCOPE: CPT

## 2023-04-23 PROCEDURE — 86140 C-REACTIVE PROTEIN: CPT

## 2023-04-23 PROCEDURE — 83690 ASSAY OF LIPASE: CPT

## 2023-04-23 PROCEDURE — 81025 URINE PREGNANCY TEST: CPT

## 2023-04-24 LAB
EGFRCR SERPLBLD CKD-EPI 2021: 125 ML/MIN (ref 60–?)
SARS-COV-2 RNA RESP QL NAA+PROBE: NEGATIVE

## 2023-05-13 ENCOUNTER — HOSPITAL ENCOUNTER (EMERGENCY)
Dept: HOSPITAL 41 - JD.ED | Age: 23
Discharge: HOME | End: 2023-05-13
Payer: MEDICAID

## 2023-05-13 DIAGNOSIS — Z79.899: ICD-10-CM

## 2023-05-13 DIAGNOSIS — J45.909: ICD-10-CM

## 2023-05-13 DIAGNOSIS — N12: Primary | ICD-10-CM

## 2023-05-13 LAB
ALBUMIN SERPL-MCNC: 3.5 G/DL (ref 3.4–5)
ALBUMIN/GLOB SERPL: 1 {RATIO} (ref 1–2)
ALP SERPL-CCNC: 64 U/L (ref 46–116)
ALT SERPL-CCNC: 15 U/L (ref 14–59)
ANION GAP SERPL CALC-SCNC: 8.6 MMOL/L (ref 5–15)
APPEARANCE UR: (no result)
AST SERPL-CCNC: 16 U/L (ref 15–37)
BACTERIA URNS QL MICRO: (no result) /HPF
BASOPHILS # BLD AUTO: 0.04 K/MM3 (ref 0.01–0.08)
BASOPHILS NFR BLD AUTO: 0.5 % (ref 0.1–1.2)
BILIRUB SERPL-MCNC: 0.4 MG/DL (ref 0.2–1)
BILIRUB UR STRIP-MCNC: NEGATIVE MG/DL
BUN SERPL-MCNC: 16 MG/DL (ref 7–18)
BUN/CREAT SERPL: 22.9 (ref 14–18)
CALCIUM SERPL-MCNC: 8.5 MG/DL (ref 8.5–10.1)
CHLORIDE SERPL-SCNC: 103 MEQ/L (ref 98–107)
CO2 SERPL-SCNC: 29 MEQ/L (ref 21–32)
COLOR UR: (no result)
CREAT CL 24H UR+SERPL-VRATE: 117.01 ML/MIN
CREAT SERPL-MCNC: 0.7 MG/DL (ref 0.55–1.02)
EGFRCR SERPLBLD CKD-EPI 2021: 125 ML/MIN (ref 60–?)
EOSINOPHIL # BLD AUTO: 0.09 K/MM3 (ref 0.04–0.36)
EOSINOPHIL NFR BLD AUTO: 1.1 % (ref 0.7–5.8)
GLOBULIN SER-MCNC: 3.4 GM/DL
GLUCOSE SERPL-MCNC: 83 MG/DL (ref 70–99)
GLUCOSE UR STRIP-MCNC: NEGATIVE MG/DL
HCT VFR BLD AUTO: 37.9 % (ref 34.1–44.9)
HGB BLD-MCNC: 12.7 GM/DL (ref 11.2–15.7)
IMM GRANULOCYTES # BLD: 0.02 K/MM3 (ref 0–0.1)
IMM GRANULOCYTES NFR BLD: 0.3 % (ref ?–1)
KETONES UR STRIP-MCNC: NEGATIVE MG/DL
LYMPHOCYTES # BLD AUTO: 1.57 K/MM3 (ref 1.18–3.74)
LYMPHOCYTES NFR BLD AUTO: 19.7 % (ref 19.3–51.7)
MCH RBC QN AUTO: 29 PG (ref 25.6–32.2)
MCHC RBC AUTO-ENTMCNC: 33.5 G/DL (ref 32.2–35.5)
MCHC RBC AUTO-ENTMCNC: 86.5 FL (ref 79.4–94.8)
MONOCYTES # BLD AUTO: 0.8 K/MM3 (ref 0.24–0.36)
MONOCYTES NFR BLD AUTO: 10 % (ref 4.7–12.5)
MUCOUS THREADS URNS QL MICRO: (no result) /HPF
NEUTROPHILS # BLD AUTO: 5.45 K/MM3 (ref 1.56–6.13)
NEUTROPHILS NFR BLD AUTO: 68.4 % (ref 34–71.1)
NITRITE UR QL: NEGATIVE
PH UR STRIP: 6.5 [PH] (ref 5–8)
PLATELET # BLD AUTO: 264 K/MM3 (ref 182–369)
PMV BLD AUTO: 9.1 FL (ref 9.4–12.3)
POTASSIUM SERPL-SCNC: 3.6 MEQ/L (ref 3.5–5.1)
PROT SERPL-MCNC: 6.9 G/DL (ref 6.4–8.2)
PROT UR STRIP-MCNC: (no result) MG/DL
RBC # BLD AUTO: 4.38 M/MM3 (ref 3.98–5.22)
RBC # URNS HPF: (no result) /HPF (ref 0–5)
RBC UR QL: (no result)
SODIUM SERPL-SCNC: 137 MEQ/L (ref 136–145)
SP GR UR STRIP: 1.02 (ref 1–1.03)
SQUAMOUS #/AREA URNS HPF: (no result) /HPF (ref 0–5)
UROBILINOGEN UR STRIP-ACNC: 0.2 (ref 0.2–1)
WBC # BLD AUTO: 7.97 K/MM3 (ref 3.98–10.04)
WBC CLUMPS #/AREA URNS HPF: (no result) /HPF
WBC UR QL: (no result) /HPF (ref 0–5)

## 2023-05-13 PROCEDURE — 99284 EMERGENCY DEPT VISIT MOD MDM: CPT

## 2023-05-13 PROCEDURE — 80053 COMPREHEN METABOLIC PANEL: CPT

## 2023-05-13 PROCEDURE — 81001 URINALYSIS AUTO W/SCOPE: CPT

## 2023-05-13 PROCEDURE — 96361 HYDRATE IV INFUSION ADD-ON: CPT

## 2023-05-13 PROCEDURE — 36415 COLL VENOUS BLD VENIPUNCTURE: CPT

## 2023-05-13 PROCEDURE — 96365 THER/PROPH/DIAG IV INF INIT: CPT

## 2023-05-13 PROCEDURE — 85025 COMPLETE CBC W/AUTO DIFF WBC: CPT

## 2023-05-13 PROCEDURE — 87186 SC STD MICRODIL/AGAR DIL: CPT

## 2023-05-13 PROCEDURE — 87088 URINE BACTERIA CULTURE: CPT

## 2023-05-13 PROCEDURE — 96375 TX/PRO/DX INJ NEW DRUG ADDON: CPT

## 2023-05-13 PROCEDURE — 84702 CHORIONIC GONADOTROPIN TEST: CPT

## 2023-05-13 PROCEDURE — 87086 URINE CULTURE/COLONY COUNT: CPT

## 2023-05-15 ENCOUNTER — HOSPITAL ENCOUNTER (EMERGENCY)
Dept: HOSPITAL 41 - JD.ED | Age: 23
Discharge: HOME | End: 2023-05-15
Payer: MEDICAID

## 2023-05-15 DIAGNOSIS — R11.2: ICD-10-CM

## 2023-05-15 DIAGNOSIS — Z79.899: ICD-10-CM

## 2023-05-15 DIAGNOSIS — N12: Primary | ICD-10-CM

## 2023-05-15 LAB
ALBUMIN SERPL-MCNC: 3.3 G/DL (ref 3.4–5)
ALBUMIN/GLOB SERPL: 1 {RATIO} (ref 1–2)
ALP SERPL-CCNC: 57 U/L (ref 46–116)
ALT SERPL-CCNC: 13 U/L (ref 14–59)
ANION GAP SERPL CALC-SCNC: 11.3 MMOL/L (ref 5–15)
APPEARANCE UR: CLEAR
AST SERPL-CCNC: 13 U/L (ref 15–37)
BASOPHILS # BLD AUTO: 0.03 K/MM3 (ref 0.01–0.08)
BASOPHILS NFR BLD AUTO: 0.4 % (ref 0.1–1.2)
BILIRUB SERPL-MCNC: 0.3 MG/DL (ref 0.2–1)
BILIRUB UR STRIP-MCNC: NEGATIVE MG/DL
BUN SERPL-MCNC: 14 MG/DL (ref 7–18)
BUN/CREAT SERPL: 23.3 (ref 14–18)
CALCIUM SERPL-MCNC: 8.2 MG/DL (ref 8.5–10.1)
CHLORIDE SERPL-SCNC: 102 MEQ/L (ref 98–107)
CO2 SERPL-SCNC: 27 MEQ/L (ref 21–32)
COLOR UR: YELLOW
CREAT CL 24H UR+SERPL-VRATE: 136.51 ML/MIN
CREAT SERPL-MCNC: 0.6 MG/DL (ref 0.55–1.02)
EGFRCR SERPLBLD CKD-EPI 2021: 129 ML/MIN (ref 60–?)
EOSINOPHIL # BLD AUTO: 0.05 K/MM3 (ref 0.04–0.36)
EOSINOPHIL NFR BLD AUTO: 0.7 % (ref 0.7–5.8)
GLOBULIN SER-MCNC: 3.4 GM/DL
GLUCOSE SERPL-MCNC: 84 MG/DL (ref 70–99)
GLUCOSE UR STRIP-MCNC: NEGATIVE MG/DL
HCT VFR BLD AUTO: 37.2 % (ref 34.1–44.9)
HGB BLD-MCNC: 12.4 GM/DL (ref 11.2–15.7)
IMM GRANULOCYTES # BLD: 0.01 K/MM3 (ref 0–0.1)
IMM GRANULOCYTES NFR BLD: 0.1 % (ref ?–1)
KETONES UR STRIP-MCNC: NEGATIVE MG/DL
LYMPHOCYTES # BLD AUTO: 1.3 K/MM3 (ref 1.18–3.74)
LYMPHOCYTES NFR BLD AUTO: 19.3 % (ref 19.3–51.7)
MCH RBC QN AUTO: 28.8 PG (ref 25.6–32.2)
MCHC RBC AUTO-ENTMCNC: 33.3 G/DL (ref 32.2–35.5)
MCHC RBC AUTO-ENTMCNC: 86.3 FL (ref 79.4–94.8)
MONOCYTES # BLD AUTO: 0.5 K/MM3 (ref 0.24–0.36)
MONOCYTES NFR BLD AUTO: 7.4 % (ref 4.7–12.5)
NEUTROPHILS # BLD AUTO: 4.85 K/MM3 (ref 1.56–6.13)
NEUTROPHILS NFR BLD AUTO: 72.1 % (ref 34–71.1)
NITRITE UR QL: NEGATIVE
PH UR STRIP: 6.5 [PH] (ref 5–8)
PLATELET # BLD AUTO: 271 K/MM3 (ref 182–369)
PMV BLD AUTO: 9.1 FL (ref 9.4–12.3)
POTASSIUM SERPL-SCNC: 3.3 MEQ/L (ref 3.5–5.1)
PROT SERPL-MCNC: 6.7 G/DL (ref 6.4–8.2)
PROT UR STRIP-MCNC: NEGATIVE MG/DL
RBC # BLD AUTO: 4.31 M/MM3 (ref 3.98–5.22)
RBC UR QL: NEGATIVE
SODIUM SERPL-SCNC: 137 MEQ/L (ref 136–145)
SP GR UR STRIP: 1.02 (ref 1–1.03)
UROBILINOGEN UR STRIP-ACNC: 1 (ref 0.2–1)
WBC # BLD AUTO: 6.74 K/MM3 (ref 3.98–10.04)

## 2023-05-15 PROCEDURE — 96374 THER/PROPH/DIAG INJ IV PUSH: CPT

## 2023-05-15 PROCEDURE — 81003 URINALYSIS AUTO W/O SCOPE: CPT

## 2023-05-15 PROCEDURE — 96361 HYDRATE IV INFUSION ADD-ON: CPT

## 2023-05-15 PROCEDURE — 81025 URINE PREGNANCY TEST: CPT

## 2023-05-15 PROCEDURE — 85025 COMPLETE CBC W/AUTO DIFF WBC: CPT

## 2023-05-15 PROCEDURE — 36415 COLL VENOUS BLD VENIPUNCTURE: CPT

## 2023-05-15 PROCEDURE — 80053 COMPREHEN METABOLIC PANEL: CPT

## 2023-05-15 PROCEDURE — 99284 EMERGENCY DEPT VISIT MOD MDM: CPT

## 2023-05-27 ENCOUNTER — HOSPITAL ENCOUNTER (EMERGENCY)
Dept: HOSPITAL 41 - JD.ED | Age: 23
LOS: 1 days | Discharge: HOME | End: 2023-05-28
Payer: MEDICAID

## 2023-05-27 DIAGNOSIS — J45.909: ICD-10-CM

## 2023-05-27 DIAGNOSIS — K21.00: Primary | ICD-10-CM

## 2023-05-27 PROCEDURE — 80053 COMPREHEN METABOLIC PANEL: CPT

## 2023-05-27 PROCEDURE — 71046 X-RAY EXAM CHEST 2 VIEWS: CPT

## 2023-05-27 PROCEDURE — 99284 EMERGENCY DEPT VISIT MOD MDM: CPT

## 2023-05-27 PROCEDURE — 85025 COMPLETE CBC W/AUTO DIFF WBC: CPT

## 2023-05-27 PROCEDURE — 81003 URINALYSIS AUTO W/O SCOPE: CPT

## 2023-05-27 PROCEDURE — 84703 CHORIONIC GONADOTROPIN ASSAY: CPT

## 2023-05-27 PROCEDURE — 36415 COLL VENOUS BLD VENIPUNCTURE: CPT

## 2023-05-28 LAB
ALBUMIN SERPL-MCNC: 4 G/DL (ref 3.4–5)
ALBUMIN/GLOB SERPL: 1.1 {RATIO} (ref 1–2)
ALP SERPL-CCNC: 76 U/L (ref 46–116)
ALT SERPL-CCNC: 15 U/L (ref 14–59)
ANION GAP SERPL CALC-SCNC: 11.3 MMOL/L (ref 5–15)
APPEARANCE UR: CLEAR
AST SERPL-CCNC: 16 U/L (ref 15–37)
BASOPHILS # BLD AUTO: 0.03 K/MM3 (ref 0.01–0.08)
BASOPHILS NFR BLD AUTO: 0.4 % (ref 0.1–1.2)
BILIRUB SERPL-MCNC: 0.4 MG/DL (ref 0.2–1)
BILIRUB UR STRIP-MCNC: NEGATIVE MG/DL
BUN SERPL-MCNC: 15 MG/DL (ref 7–18)
BUN/CREAT SERPL: 21.4 (ref 14–18)
CALCIUM SERPL-MCNC: 9 MG/DL (ref 8.5–10.1)
CHLORIDE SERPL-SCNC: 102 MEQ/L (ref 98–107)
CO2 SERPL-SCNC: 29 MEQ/L (ref 21–32)
COLOR UR: YELLOW
CREAT CL 24H UR+SERPL-VRATE: 107.93 ML/MIN
CREAT SERPL-MCNC: 0.7 MG/DL (ref 0.55–1.02)
EGFRCR SERPLBLD CKD-EPI 2021: 125 ML/MIN (ref 60–?)
EOSINOPHIL # BLD AUTO: 0.14 K/MM3 (ref 0.04–0.36)
EOSINOPHIL NFR BLD AUTO: 2 % (ref 0.7–5.8)
GLOBULIN SER-MCNC: 3.8 GM/DL
GLUCOSE SERPL-MCNC: 87 MG/DL (ref 70–99)
GLUCOSE UR STRIP-MCNC: NEGATIVE MG/DL
HCT VFR BLD AUTO: 39.6 % (ref 34.1–44.9)
HGB BLD-MCNC: 13.2 GM/DL (ref 11.2–15.7)
IMM GRANULOCYTES # BLD: 0.01 K/MM3 (ref 0–0.1)
IMM GRANULOCYTES NFR BLD: 0.1 % (ref ?–1)
KETONES UR STRIP-MCNC: NEGATIVE MG/DL
LYMPHOCYTES # BLD AUTO: 1.2 K/MM3 (ref 1.18–3.74)
LYMPHOCYTES NFR BLD AUTO: 16.8 % (ref 19.3–51.7)
MCH RBC QN AUTO: 28.5 PG (ref 25.6–32.2)
MCHC RBC AUTO-ENTMCNC: 33.3 G/DL (ref 32.2–35.5)
MCHC RBC AUTO-ENTMCNC: 85.5 FL (ref 79.4–94.8)
MONOCYTES # BLD AUTO: 0.58 K/MM3 (ref 0.24–0.36)
MONOCYTES NFR BLD AUTO: 8.1 % (ref 4.7–12.5)
NEUTROPHILS # BLD AUTO: 5.18 K/MM3 (ref 1.56–6.13)
NEUTROPHILS NFR BLD AUTO: 72.6 % (ref 34–71.1)
NITRITE UR QL: NEGATIVE
PH UR STRIP: 6 [PH] (ref 5–8)
PLATELET # BLD AUTO: 292 K/MM3 (ref 182–369)
PMV BLD AUTO: 9.1 FL (ref 9.4–12.3)
POTASSIUM SERPL-SCNC: 3.3 MEQ/L (ref 3.5–5.1)
PROT SERPL-MCNC: 7.8 G/DL (ref 6.4–8.2)
PROT UR STRIP-MCNC: NEGATIVE MG/DL
RBC # BLD AUTO: 4.63 M/MM3 (ref 3.98–5.22)
RBC UR QL: NEGATIVE
SODIUM SERPL-SCNC: 139 MEQ/L (ref 136–145)
UROBILINOGEN UR STRIP-ACNC: 1 (ref 0.2–1)
WBC # BLD AUTO: 7.14 K/MM3 (ref 3.98–10.04)

## 2023-05-31 ENCOUNTER — HOSPITAL ENCOUNTER (EMERGENCY)
Dept: HOSPITAL 41 - JD.ED | Age: 23
Discharge: HOME | End: 2023-05-31
Payer: MEDICAID

## 2023-05-31 DIAGNOSIS — J45.909: ICD-10-CM

## 2023-05-31 DIAGNOSIS — S91.215A: Primary | ICD-10-CM
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
## 2023-05-31 PROCEDURE — 99283 EMERGENCY DEPT VISIT LOW MDM: CPT

## 2023-05-31 PROCEDURE — 73630 X-RAY EXAM OF FOOT: CPT

## 2023-05-31 PROCEDURE — 96372 THER/PROPH/DIAG INJ SC/IM: CPT

## 2023-06-08 ENCOUNTER — HOSPITAL ENCOUNTER (EMERGENCY)
Dept: HOSPITAL 41 - JD.ED | Age: 23
LOS: 1 days | Discharge: HOME | End: 2023-06-09
Payer: SELF-PAY

## 2023-06-08 DIAGNOSIS — J45.909: ICD-10-CM

## 2023-06-08 DIAGNOSIS — R11.0: Primary | ICD-10-CM

## 2023-06-08 PROCEDURE — 96372 THER/PROPH/DIAG INJ SC/IM: CPT

## 2023-06-08 PROCEDURE — 99283 EMERGENCY DEPT VISIT LOW MDM: CPT

## 2023-07-03 ENCOUNTER — HOSPITAL ENCOUNTER (EMERGENCY)
Dept: HOSPITAL 41 - JD.ED | Age: 23
Discharge: HOME | End: 2023-07-03
Payer: SELF-PAY

## 2023-07-03 DIAGNOSIS — O99.891: Primary | ICD-10-CM

## 2023-07-03 DIAGNOSIS — J45.909: ICD-10-CM

## 2023-07-03 DIAGNOSIS — O99.511: ICD-10-CM

## 2023-07-03 DIAGNOSIS — Z3A.01: ICD-10-CM

## 2023-07-03 DIAGNOSIS — Z02.89: ICD-10-CM

## 2023-07-03 LAB
AMPHET UR QL SCN: NEGATIVE
AMPHET UR QL SCN: NEGATIVE
BARBITURATES UR QL SCN: NEGATIVE
BENZODIAZ UR QL SCN: NEGATIVE
BUPRENORPHINE UR QL: NEGATIVE
METHADONE UR QL SCN: NEGATIVE
OXYCODONE UR QL SCN: NEGATIVE
PROPOXYPH UR QL SCN: NEGATIVE
THC UR QL SCN>20 NG/ML: NEGATIVE

## 2023-07-03 PROCEDURE — 84702 CHORIONIC GONADOTROPIN TEST: CPT

## 2023-07-03 PROCEDURE — 96374 THER/PROPH/DIAG INJ IV PUSH: CPT

## 2023-07-03 PROCEDURE — 99283 EMERGENCY DEPT VISIT LOW MDM: CPT

## 2023-07-03 PROCEDURE — 80306 DRUG TEST PRSMV INSTRMNT: CPT

## 2023-07-03 PROCEDURE — 36415 COLL VENOUS BLD VENIPUNCTURE: CPT

## 2023-07-03 PROCEDURE — 81025 URINE PREGNANCY TEST: CPT
